# Patient Record
Sex: MALE | Race: WHITE | NOT HISPANIC OR LATINO | URBAN - METROPOLITAN AREA
[De-identification: names, ages, dates, MRNs, and addresses within clinical notes are randomized per-mention and may not be internally consistent; named-entity substitution may affect disease eponyms.]

---

## 2023-09-20 ENCOUNTER — OFFICE VISIT (OUTPATIENT)
Dept: GASTROENTEROLOGY | Facility: CLINIC | Age: 53
End: 2023-09-20
Payer: COMMERCIAL

## 2023-09-20 VITALS — WEIGHT: 213 LBS | HEIGHT: 71 IN | BODY MASS INDEX: 29.82 KG/M2

## 2023-09-20 DIAGNOSIS — K59.09 OTHER CONSTIPATION: ICD-10-CM

## 2023-09-20 DIAGNOSIS — Z79.01 CURRENT USE OF LONG TERM ANTICOAGULATION: ICD-10-CM

## 2023-09-20 DIAGNOSIS — K62.5 RECTAL BLEEDING: Primary | ICD-10-CM

## 2023-09-20 PROCEDURE — 99204 OFFICE O/P NEW MOD 45 MIN: CPT | Performed by: STUDENT IN AN ORGANIZED HEALTH CARE EDUCATION/TRAINING PROGRAM

## 2023-09-20 RX ORDER — POLYETHYLENE GLYCOL 3350 17 G/17G
17 POWDER, FOR SOLUTION ORAL DAILY
Qty: 510 G | Refills: 2 | Status: SHIPPED | OUTPATIENT
Start: 2023-09-20 | End: 2023-12-19

## 2023-09-20 RX ORDER — HYDROCORTISONE ACETATE 25 MG/1
25 SUPPOSITORY RECTAL 2 TIMES DAILY
Qty: 12 SUPPOSITORY | Refills: 0 | Status: SHIPPED | OUTPATIENT
Start: 2023-09-20

## 2023-09-20 NOTE — PROGRESS NOTES
Consultation - [address] [place] [address] Gastroenterology     Kamryn Humphrey 48 y.o. male MRN: [id number]  Unit/Bed#:  Encounter: [id number]    Consults    ASSESSMENT and PLAN    1. Rectal bleeding  Suspect benign anorectal etiology such as hemorrhoid or fissure. Working diagnosis is internal hemorrhoid exacerbated constipation and recent blood thinner use. Continue Metamucil and start MiraLAX as needed for constipation symptoms. Use hydrocortisone suppositories for 5 days and monitor for recurrence of bleeding. Check CBC to ensure no anemia. Advised to notify our office of any severe bleeding in the future. Would hold off on elective colonoscopy as he needs to be on blood thinner for minimum of 3 to 6 months. Advised that he follow-up with primary care for management of blood thinners. - hydrocortisone (ANUSOL-HC) 25 mg suppository; Insert 1 suppository (25 mg total) into the rectum 2 (two) times a day  Dispense: 12 suppository; Refill: 0  - CBC and differential; Future  - CBC and differential    2. Current use of long term anticoagulation  Recent started on Xarelto by emergency department at [facility] for DVT. He does not have a primary care doctor as she left the region. Strongly advised him to follow-up with a new primary care doctor for blood thinner management. 3. Other constipation  In setting of reduction of physical activity. Continue Metamucil, add MiraLAX, hydrocortisone suppositories for hemorrhoids. - polyethylene glycol (GLYCOLAX) 17 GM/SCOOP powder; Take 17 g by mouth daily  Dispense: 510 g; Refill: 2    Follow-up in 3 months    Chief Complaint   Patient presents with   • Rectal Bleeding       HPI  Kamryn Humphrey is a 48y.o. year old male with a past medical history of HLD presenting for evaluation of rectal bleeding.   He had a recent calf tear while playing pickle ball and on Sunday noticed his foot was swelling and went to the emergency department at [facility] where he was found to have a DVT.  He was started on Xarelto. Since starting it he has had some low-volume rectal bleeding with bowel movements only. Blood is described as bright red, no associated pain. He reports he was diagnosed with hemorrhoids as a teenager and has had intermittent rectal bleeding the past with ingestion of food such as long constant jalapenos. He has taken 3-4 scoops of Metamucil nightly for 10 years however given his recent reduction in physical activity he is a bit more constipated than usual.  No prior colonoscopy although he had a negative Cologuard 1 year ago. No family history of colon cancer. Patient stated no reported fevers, chills, nausea, vomiting, hematemesis, odynophagia, dysphagia, heartburn, regurgitation, SOB, CP, abdominal pain, changes in bowel movement with diarrhea or constipation, melena, or unintentional weight loss. GI History:  Prior Colonoscopy: No prior colonoscopy; negative Cologuard 1 year ago  Prior EGD:  No prior EGD  Family hx:  No reported first degree relatives with colorectal cancer  Surgical hx: No prior abdominal surgeries  Blood thinners: Denies antiplatelet or anticoagulation use  NSAID use: Denies regular NSAID use  DM meds: None  Social Hx: No regular ETOH, smoking, or drug use. Historical Information   Past Medical History:   Diagnosis Date   • DVT (deep venous thrombosis) ([address])      History reviewed. No pertinent surgical history. Social History   Social History     Substance and Sexual Activity   Alcohol Use Yes     Social History     Substance and Sexual Activity   Drug Use Not on file     Social History     Tobacco Use   Smoking Status Never   Smokeless Tobacco Current   • Types: Chew     History reviewed. No pertinent family history. Meds/Allergies     No current facility-administered medications for this visit.      (Not in a hospital admission)      No Known Allergies    PHYSICAL EXAM    Visit Vitals  Ht 5' 11" (1.803 m)   Wt 96.6 kg (213 lb) BMI 29.71 kg/m²   Smoking Status Never   BSA 2.17 m²     Body mass index is 29.71 kg/m². General Appearance: NAD, cooperative, alert  Eyes: Anicteric, EOMI  ENT: Normocephalic, atraumatic, normal mucosa  Neck: symmetrical, trachea midline  Lungs: clear to auscultation, non-labored respirations on room air, no wheezing  CV: S1 S2+ radial pulses bilaterally  GI:  Soft, non-tender, non-distended; normal bowel sounds; no masses, no organomegaly   Rectal: Deferred  Musculoskeletal: No gross deformities or pitting edema  Skin:  No jaundice, no rashes  Neurologic: awake, alert, oriented, no gross deficits    No results found for: "GLUCOSE", "CALCIUM", "NA", "K", "CO2", "CL", "BUN", "CREATININE"  No results found for: "WBC", "HGB", "MCV", "PLT"  No results found for: "ALT", "AST", "GGT", "ALKPHOS", "TBILI"  No results found for: "AMYLASE"  No results found for: "LIPASE"  No results found for: "IRON", "TIBC", "FERRITIN"  No results found for: "INR"    No results found. Imaging Studies: I have personally reviewed pertinent reports. Pathology, and Other Studies: I have personally reviewed pertinent reports.       REVIEW OF SYSTEMS    CONSTITUTIONAL: negative unless stated in HPI  GASTROINTESTINAL: As noted in the HPI

## 2023-09-20 NOTE — PATIENT INSTRUCTIONS
Continue using Metamucil. Use MiraLAX as needed for constipation symptoms to avoid hard stool which could exacerbate hemorrhoids. He can use the suppositories for 5 days maximum. Continue Tylenol as needed for pain, maximum 4 g daily. Recommend primary care follow-up for management of anticoagulation. Notify the office if any severe rectal bleeding.

## 2023-10-12 LAB
BASOPHILS # BLD AUTO: 0 X10E3/UL (ref 0–0.2)
BASOPHILS NFR BLD AUTO: 0 %
EOSINOPHIL # BLD AUTO: 0 X10E3/UL (ref 0–0.4)
EOSINOPHIL NFR BLD AUTO: 1 %
ERYTHROCYTE [DISTWIDTH] IN BLOOD BY AUTOMATED COUNT: 12 % (ref 11.6–15.4)
HCT VFR BLD AUTO: 46.8 % (ref 37.5–51)
HGB BLD-MCNC: 15.8 G/DL (ref 13–17.7)
IMM GRANULOCYTES # BLD: 0 X10E3/UL (ref 0–0.1)
IMM GRANULOCYTES NFR BLD: 0 %
LYMPHOCYTES # BLD AUTO: 1.8 X10E3/UL (ref 0.7–3.1)
LYMPHOCYTES NFR BLD AUTO: 27 %
MCH RBC QN AUTO: 30.2 PG (ref 26.6–33)
MCHC RBC AUTO-ENTMCNC: 33.8 G/DL (ref 31.5–35.7)
MCV RBC AUTO: 90 FL (ref 79–97)
MONOCYTES # BLD AUTO: 0.3 X10E3/UL (ref 0.1–0.9)
MONOCYTES NFR BLD AUTO: 5 %
NEUTROPHILS # BLD AUTO: 4.3 X10E3/UL (ref 1.4–7)
NEUTROPHILS NFR BLD AUTO: 67 %
PLATELET # BLD AUTO: 261 X10E3/UL (ref 150–450)
RBC # BLD AUTO: 5.23 X10E6/UL (ref 4.14–5.8)
WBC # BLD AUTO: 6.4 X10E3/UL (ref 3.4–10.8)